# Patient Record
Sex: MALE | Race: WHITE | NOT HISPANIC OR LATINO
[De-identification: names, ages, dates, MRNs, and addresses within clinical notes are randomized per-mention and may not be internally consistent; named-entity substitution may affect disease eponyms.]

---

## 2017-02-16 ENCOUNTER — MEDICATION RENEWAL (OUTPATIENT)
Age: 67
End: 2017-02-16

## 2017-02-21 ENCOUNTER — RX RENEWAL (OUTPATIENT)
Age: 67
End: 2017-02-21

## 2017-02-22 ENCOUNTER — RX RENEWAL (OUTPATIENT)
Age: 67
End: 2017-02-22

## 2017-05-15 ENCOUNTER — RX RENEWAL (OUTPATIENT)
Age: 67
End: 2017-05-15

## 2017-06-14 ENCOUNTER — RX RENEWAL (OUTPATIENT)
Age: 67
End: 2017-06-14

## 2017-06-20 ENCOUNTER — RX RENEWAL (OUTPATIENT)
Age: 67
End: 2017-06-20

## 2017-06-21 ENCOUNTER — APPOINTMENT (OUTPATIENT)
Dept: ENDOCRINOLOGY | Facility: CLINIC | Age: 67
End: 2017-06-21

## 2017-06-22 ENCOUNTER — MEDICATION RENEWAL (OUTPATIENT)
Age: 67
End: 2017-06-22

## 2017-06-22 ENCOUNTER — APPOINTMENT (OUTPATIENT)
Dept: ENDOCRINOLOGY | Facility: CLINIC | Age: 67
End: 2017-06-22

## 2017-06-22 RX ORDER — BLOOD SUGAR DIAGNOSTIC
STRIP MISCELLANEOUS 4 TIMES DAILY
Qty: 2 | Refills: 0 | Status: ACTIVE | COMMUNITY
Start: 2017-06-22

## 2017-06-22 RX ORDER — LANCETS
EACH MISCELLANEOUS
Qty: 2 | Refills: 0 | Status: ACTIVE | COMMUNITY
Start: 2017-06-22

## 2017-06-22 RX ORDER — BLOOD-GLUCOSE METER
W/DEVICE EACH MISCELLANEOUS
Qty: 1 | Refills: 0 | Status: ACTIVE | COMMUNITY
Start: 2017-06-22

## 2017-09-12 ENCOUNTER — RX RENEWAL (OUTPATIENT)
Age: 67
End: 2017-09-12

## 2017-09-14 ENCOUNTER — RX RENEWAL (OUTPATIENT)
Age: 67
End: 2017-09-14

## 2017-09-19 ENCOUNTER — RX RENEWAL (OUTPATIENT)
Age: 67
End: 2017-09-19

## 2017-10-18 ENCOUNTER — RX RENEWAL (OUTPATIENT)
Age: 67
End: 2017-10-18

## 2017-11-30 ENCOUNTER — RX RENEWAL (OUTPATIENT)
Age: 67
End: 2017-11-30

## 2017-12-04 ENCOUNTER — CLINICAL ADVICE (OUTPATIENT)
Age: 67
End: 2017-12-04

## 2017-12-06 ENCOUNTER — APPOINTMENT (OUTPATIENT)
Dept: ENDOCRINOLOGY | Facility: CLINIC | Age: 67
End: 2017-12-06
Payer: MEDICARE

## 2017-12-06 VITALS
WEIGHT: 208 LBS | HEART RATE: 79 BPM | DIASTOLIC BLOOD PRESSURE: 82 MMHG | HEIGHT: 70 IN | SYSTOLIC BLOOD PRESSURE: 122 MMHG | BODY MASS INDEX: 29.78 KG/M2 | OXYGEN SATURATION: 98 %

## 2017-12-06 LAB
25(OH)D3 SERPL-MCNC: 28.6 NG/ML
ALBUMIN SERPL ELPH-MCNC: 4.2 G/DL
ALP BLD-CCNC: 83 U/L
ALT SERPL-CCNC: 25 U/L
ANION GAP SERPL CALC-SCNC: 13 MMOL/L
AST SERPL-CCNC: 17 U/L
BASOPHILS # BLD AUTO: 0.03 K/UL
BASOPHILS NFR BLD AUTO: 0.4 %
BILIRUB SERPL-MCNC: 1.2 MG/DL
BUN SERPL-MCNC: 19 MG/DL
CALCIUM SERPL-MCNC: 9.9 MG/DL
CHLORIDE SERPL-SCNC: 105 MMOL/L
CHOLEST SERPL-MCNC: 217 MG/DL
CHOLEST/HDLC SERPL: 4.4 RATIO
CO2 SERPL-SCNC: 25 MMOL/L
CREAT SERPL-MCNC: 1.18 MG/DL
CREAT SPEC-SCNC: 78 MG/DL
EOSINOPHIL # BLD AUTO: 0.13 K/UL
EOSINOPHIL NFR BLD AUTO: 1.9 %
GLUCOSE SERPL-MCNC: 117 MG/DL
HBA1C MFR BLD HPLC: 8.9
HCT VFR BLD CALC: 49.3 %
HDLC SERPL-MCNC: 49 MG/DL
HGB BLD-MCNC: 15.9 G/DL
IMM GRANULOCYTES NFR BLD AUTO: 0.4 %
LDLC SERPL CALC-MCNC: 146 MG/DL
LYMPHOCYTES # BLD AUTO: 1.6 K/UL
LYMPHOCYTES NFR BLD AUTO: 22.8 %
MAN DIFF?: NORMAL
MCHC RBC-ENTMCNC: 28.6 PG
MCHC RBC-ENTMCNC: 32.3 GM/DL
MCV RBC AUTO: 88.7 FL
MICROALBUMIN 24H UR DL<=1MG/L-MCNC: 0.4 MG/DL
MICROALBUMIN/CREAT 24H UR-RTO: 5 MG/G
MONOCYTES # BLD AUTO: 0.59 K/UL
MONOCYTES NFR BLD AUTO: 8.4 %
NEUTROPHILS # BLD AUTO: 4.63 K/UL
NEUTROPHILS NFR BLD AUTO: 66.1 %
PLATELET # BLD AUTO: 198 K/UL
POTASSIUM SERPL-SCNC: 5.2 MMOL/L
PROT SERPL-MCNC: 7.1 G/DL
PSA SERPL-MCNC: 2.3 NG/ML
RBC # BLD: 5.56 M/UL
RBC # FLD: 13.9 %
SODIUM SERPL-SCNC: 143 MMOL/L
T4 FREE SERPL-MCNC: 1.2 NG/DL
TRIGL SERPL-MCNC: 108 MG/DL
TSH SERPL-ACNC: 1.66 UIU/ML
VIT B12 SERPL-MCNC: 1260 PG/ML
WBC # FLD AUTO: 7.01 K/UL

## 2017-12-06 PROCEDURE — 83036 HEMOGLOBIN GLYCOSYLATED A1C: CPT | Mod: QW

## 2017-12-06 PROCEDURE — 99214 OFFICE O/P EST MOD 30 MIN: CPT | Mod: 25

## 2017-12-09 ENCOUNTER — MOBILE ON CALL (OUTPATIENT)
Age: 67
End: 2017-12-09

## 2017-12-11 ENCOUNTER — MEDICATION RENEWAL (OUTPATIENT)
Age: 67
End: 2017-12-11

## 2018-01-08 ENCOUNTER — APPOINTMENT (OUTPATIENT)
Dept: ENDOCRINOLOGY | Facility: CLINIC | Age: 68
End: 2018-01-08
Payer: MEDICARE

## 2018-01-08 PROCEDURE — 95250 CONT GLUC MNTR PHYS/QHP EQP: CPT

## 2018-01-08 PROCEDURE — 95251 CONT GLUC MNTR ANALYSIS I&R: CPT

## 2018-01-08 PROCEDURE — G0108 DIAB MANAGE TRN  PER INDIV: CPT

## 2018-02-05 ENCOUNTER — MEDICATION RENEWAL (OUTPATIENT)
Age: 68
End: 2018-02-05

## 2018-02-05 ENCOUNTER — CLINICAL ADVICE (OUTPATIENT)
Age: 68
End: 2018-02-05

## 2018-02-05 RX ORDER — INSULIN LISPRO 100 [IU]/ML
100 INJECTION, SOLUTION INTRAVENOUS; SUBCUTANEOUS
Qty: 1 | Refills: 3 | Status: DISCONTINUED | COMMUNITY
Start: 2018-01-08 | End: 2018-02-05

## 2018-02-05 RX ORDER — INSULIN GLULISINE 100 [IU]/ML
100 INJECTION, SOLUTION SUBCUTANEOUS
Qty: 2 | Refills: 3 | Status: ACTIVE | COMMUNITY
Start: 2018-02-05 | End: 1900-01-01

## 2018-02-25 ENCOUNTER — RX RENEWAL (OUTPATIENT)
Age: 68
End: 2018-02-25

## 2018-04-24 ENCOUNTER — APPOINTMENT (OUTPATIENT)
Dept: ENDOCRINOLOGY | Facility: CLINIC | Age: 68
End: 2018-04-24

## 2018-06-26 ENCOUNTER — RX RENEWAL (OUTPATIENT)
Age: 68
End: 2018-06-26

## 2018-06-27 ENCOUNTER — RX RENEWAL (OUTPATIENT)
Age: 68
End: 2018-06-27

## 2018-07-23 ENCOUNTER — RX RENEWAL (OUTPATIENT)
Age: 68
End: 2018-07-23

## 2018-07-27 ENCOUNTER — RX RENEWAL (OUTPATIENT)
Age: 68
End: 2018-07-27

## 2018-09-20 ENCOUNTER — APPOINTMENT (OUTPATIENT)
Dept: FAMILY MEDICINE | Facility: CLINIC | Age: 68
End: 2018-09-20
Payer: MEDICARE

## 2018-09-20 DIAGNOSIS — Z87.09 PERSONAL HISTORY OF OTHER DISEASES OF THE RESPIRATORY SYSTEM: ICD-10-CM

## 2018-09-20 PROCEDURE — 99213 OFFICE O/P EST LOW 20 MIN: CPT

## 2018-09-20 RX ORDER — TICAGRELOR 90 MG/1
90 TABLET ORAL
Qty: 180 | Refills: 0 | Status: ACTIVE | COMMUNITY
Start: 2018-06-02

## 2018-09-20 RX ORDER — PAROXETINE HYDROCHLORIDE 10 MG/1
10 TABLET, FILM COATED ORAL
Qty: 90 | Refills: 0 | Status: ACTIVE | COMMUNITY
Start: 2018-04-27

## 2018-09-20 RX ORDER — ATORVASTATIN CALCIUM 80 MG/1
80 TABLET, FILM COATED ORAL
Qty: 90 | Refills: 0 | Status: ACTIVE | COMMUNITY
Start: 2018-05-06

## 2018-11-05 ENCOUNTER — RX RENEWAL (OUTPATIENT)
Age: 68
End: 2018-11-05

## 2018-11-16 ENCOUNTER — RX RENEWAL (OUTPATIENT)
Age: 68
End: 2018-11-16

## 2018-12-17 ENCOUNTER — RX RENEWAL (OUTPATIENT)
Age: 68
End: 2018-12-17

## 2019-01-15 ENCOUNTER — APPOINTMENT (OUTPATIENT)
Dept: GASTROENTEROLOGY | Facility: CLINIC | Age: 69
End: 2019-01-15

## 2019-01-17 ENCOUNTER — APPOINTMENT (OUTPATIENT)
Dept: FAMILY MEDICINE | Facility: CLINIC | Age: 69
End: 2019-01-17
Payer: MEDICARE

## 2019-01-17 VITALS
WEIGHT: 208 LBS | HEIGHT: 70 IN | DIASTOLIC BLOOD PRESSURE: 80 MMHG | SYSTOLIC BLOOD PRESSURE: 120 MMHG | BODY MASS INDEX: 29.78 KG/M2

## 2019-01-17 PROCEDURE — 99214 OFFICE O/P EST MOD 30 MIN: CPT

## 2019-01-17 RX ORDER — AMOXICILLIN AND CLAVULANATE POTASSIUM 875; 125 MG/1; MG/1
875-125 TABLET, COATED ORAL
Qty: 14 | Refills: 0 | Status: DISCONTINUED | COMMUNITY
Start: 2018-09-20 | End: 2019-01-17

## 2019-02-15 ENCOUNTER — RX RENEWAL (OUTPATIENT)
Age: 69
End: 2019-02-15

## 2019-05-21 ENCOUNTER — APPOINTMENT (OUTPATIENT)
Dept: ENDOCRINOLOGY | Facility: CLINIC | Age: 69
End: 2019-05-21
Payer: MEDICARE

## 2019-05-21 VITALS
DIASTOLIC BLOOD PRESSURE: 64 MMHG | OXYGEN SATURATION: 98 % | BODY MASS INDEX: 30.35 KG/M2 | HEART RATE: 64 BPM | HEIGHT: 70 IN | WEIGHT: 212 LBS | SYSTOLIC BLOOD PRESSURE: 130 MMHG

## 2019-05-21 LAB
GLUCOSE BLDC GLUCOMTR-MCNC: 80
HBA1C MFR BLD HPLC: 8.3

## 2019-05-21 PROCEDURE — 83036 HEMOGLOBIN GLYCOSYLATED A1C: CPT | Mod: QW

## 2019-05-21 PROCEDURE — 99214 OFFICE O/P EST MOD 30 MIN: CPT | Mod: 25

## 2019-05-21 PROCEDURE — 82962 GLUCOSE BLOOD TEST: CPT

## 2019-05-23 LAB
CREAT SPEC-SCNC: 103 MG/DL
MICROALBUMIN 24H UR DL<=1MG/L-MCNC: <1.2 MG/DL
MICROALBUMIN/CREAT 24H UR-RTO: NORMAL MG/G

## 2019-05-24 ENCOUNTER — CLINICAL ADVICE (OUTPATIENT)
Age: 69
End: 2019-05-24

## 2019-06-10 ENCOUNTER — RX RENEWAL (OUTPATIENT)
Age: 69
End: 2019-06-10

## 2019-07-24 ENCOUNTER — RX RENEWAL (OUTPATIENT)
Age: 69
End: 2019-07-24

## 2019-08-21 ENCOUNTER — APPOINTMENT (OUTPATIENT)
Dept: ENDOCRINOLOGY | Facility: CLINIC | Age: 69
End: 2019-08-21
Payer: MEDICARE

## 2019-08-21 VITALS — BODY MASS INDEX: 28.35 KG/M2 | HEIGHT: 70 IN | WEIGHT: 198 LBS | OXYGEN SATURATION: 98 % | HEART RATE: 70 BPM

## 2019-08-21 VITALS — DIASTOLIC BLOOD PRESSURE: 70 MMHG | SYSTOLIC BLOOD PRESSURE: 122 MMHG

## 2019-08-21 LAB — HBA1C MFR BLD HPLC: 8

## 2019-08-21 PROCEDURE — 83036 HEMOGLOBIN GLYCOSYLATED A1C: CPT | Mod: QW

## 2019-08-21 PROCEDURE — 99214 OFFICE O/P EST MOD 30 MIN: CPT | Mod: 25

## 2019-08-21 RX ORDER — PEN NEEDLE, DIABETIC 29 G X1/2"
32G X 4 MM NEEDLE, DISPOSABLE MISCELLANEOUS
Qty: 4 | Refills: 4 | Status: ACTIVE | COMMUNITY
Start: 2019-08-21 | End: 1900-01-01

## 2019-08-21 RX ORDER — PREDNISONE 20 MG/1
20 TABLET ORAL
Qty: 12 | Refills: 0 | Status: COMPLETED | COMMUNITY
Start: 2018-09-20 | End: 2019-08-21

## 2019-11-04 ENCOUNTER — APPOINTMENT (OUTPATIENT)
Dept: ENDOCRINOLOGY | Facility: CLINIC | Age: 69
End: 2019-11-04
Payer: MEDICARE

## 2019-11-04 VITALS
SYSTOLIC BLOOD PRESSURE: 140 MMHG | BODY MASS INDEX: 30.13 KG/M2 | WEIGHT: 210 LBS | HEART RATE: 54 BPM | OXYGEN SATURATION: 98 % | DIASTOLIC BLOOD PRESSURE: 82 MMHG

## 2019-11-04 LAB
GLUCOSE BLDC GLUCOMTR-MCNC: 116
HBA1C MFR BLD HPLC: 8.1

## 2019-11-04 PROCEDURE — 83036 HEMOGLOBIN GLYCOSYLATED A1C: CPT | Mod: QW

## 2019-11-04 PROCEDURE — 82962 GLUCOSE BLOOD TEST: CPT

## 2019-11-04 PROCEDURE — 95251 CONT GLUC MNTR ANALYSIS I&R: CPT

## 2019-11-04 PROCEDURE — 99213 OFFICE O/P EST LOW 20 MIN: CPT | Mod: 25

## 2019-11-11 ENCOUNTER — RX RENEWAL (OUTPATIENT)
Age: 69
End: 2019-11-11

## 2019-12-06 ENCOUNTER — MEDICATION RENEWAL (OUTPATIENT)
Age: 69
End: 2019-12-06

## 2020-01-13 ENCOUNTER — CLINICAL ADVICE (OUTPATIENT)
Age: 70
End: 2020-01-13

## 2020-02-05 ENCOUNTER — RESULT CHARGE (OUTPATIENT)
Age: 70
End: 2020-02-05

## 2020-02-05 ENCOUNTER — APPOINTMENT (OUTPATIENT)
Dept: ENDOCRINOLOGY | Facility: CLINIC | Age: 70
End: 2020-02-05
Payer: MEDICARE

## 2020-02-05 VITALS
BODY MASS INDEX: 29.92 KG/M2 | SYSTOLIC BLOOD PRESSURE: 142 MMHG | HEIGHT: 70 IN | OXYGEN SATURATION: 98 % | DIASTOLIC BLOOD PRESSURE: 70 MMHG | WEIGHT: 209 LBS | HEART RATE: 61 BPM

## 2020-02-05 DIAGNOSIS — I25.10 ATHEROSCLEROTIC HEART DISEASE OF NATIVE CORONARY ARTERY W/OUT ANGINA PECTORIS: ICD-10-CM

## 2020-02-05 LAB — HBA1C MFR BLD HPLC: 8

## 2020-02-05 PROCEDURE — 99214 OFFICE O/P EST MOD 30 MIN: CPT

## 2020-02-25 ENCOUNTER — RX RENEWAL (OUTPATIENT)
Age: 70
End: 2020-02-25

## 2020-05-21 ENCOUNTER — APPOINTMENT (OUTPATIENT)
Dept: ENDOCRINOLOGY | Facility: CLINIC | Age: 70
End: 2020-05-21

## 2020-08-07 ENCOUNTER — RX RENEWAL (OUTPATIENT)
Age: 70
End: 2020-08-07

## 2020-08-25 ENCOUNTER — RX RENEWAL (OUTPATIENT)
Age: 70
End: 2020-08-25

## 2020-09-17 ENCOUNTER — EMERGENCY (EMERGENCY)
Facility: HOSPITAL | Age: 70
LOS: 1 days | Discharge: HOME | End: 2020-09-17
Attending: EMERGENCY MEDICINE
Payer: MEDICARE

## 2020-09-17 VITALS
TEMPERATURE: 98 F | OXYGEN SATURATION: 98 % | SYSTOLIC BLOOD PRESSURE: 179 MMHG | HEART RATE: 82 BPM | DIASTOLIC BLOOD PRESSURE: 76 MMHG | RESPIRATION RATE: 18 BRPM | HEIGHT: 69 IN | WEIGHT: 199.96 LBS

## 2020-09-17 LAB
ALBUMIN SERPL ELPH-MCNC: 4.5 G/DL — SIGNIFICANT CHANGE UP (ref 3.3–5)
ALP SERPL-CCNC: 107 U/L — SIGNIFICANT CHANGE UP (ref 40–120)
ALT FLD-CCNC: 34 U/L — SIGNIFICANT CHANGE UP (ref 10–45)
ANION GAP SERPL CALC-SCNC: 11 MMOL/L — SIGNIFICANT CHANGE UP (ref 5–17)
APPEARANCE UR: SIGNIFICANT CHANGE UP
APTT BLD: 26.5 SEC — LOW (ref 27.5–35.5)
AST SERPL-CCNC: 22 U/L — SIGNIFICANT CHANGE UP (ref 10–40)
BACTERIA # UR AUTO: ABNORMAL
BASE EXCESS BLDV CALC-SCNC: 1.9 MMOL/L — SIGNIFICANT CHANGE UP (ref -2–2)
BASOPHILS # BLD AUTO: 0.03 K/UL — SIGNIFICANT CHANGE UP (ref 0–0.2)
BASOPHILS NFR BLD AUTO: 0.5 % — SIGNIFICANT CHANGE UP (ref 0–2)
BILIRUB SERPL-MCNC: 0.7 MG/DL — SIGNIFICANT CHANGE UP (ref 0.2–1.2)
BILIRUB UR-MCNC: NEGATIVE — SIGNIFICANT CHANGE UP
BUN SERPL-MCNC: 32 MG/DL — HIGH (ref 7–23)
CA-I SERPL-SCNC: 1.22 MMOL/L — SIGNIFICANT CHANGE UP (ref 1.12–1.3)
CALCIUM SERPL-MCNC: 10 MG/DL — SIGNIFICANT CHANGE UP (ref 8.4–10.5)
CHLORIDE BLDV-SCNC: 110 MMOL/L — HIGH (ref 96–108)
CHLORIDE SERPL-SCNC: 107 MMOL/L — SIGNIFICANT CHANGE UP (ref 96–108)
CO2 BLDV-SCNC: 28 MMOL/L — SIGNIFICANT CHANGE UP (ref 22–30)
CO2 SERPL-SCNC: 24 MMOL/L — SIGNIFICANT CHANGE UP (ref 22–31)
COLOR SPEC: ABNORMAL
CREAT SERPL-MCNC: 1.52 MG/DL — HIGH (ref 0.5–1.3)
DIFF PNL FLD: ABNORMAL
EOSINOPHIL # BLD AUTO: 0.14 K/UL — SIGNIFICANT CHANGE UP (ref 0–0.5)
EOSINOPHIL NFR BLD AUTO: 2.5 % — SIGNIFICANT CHANGE UP (ref 0–6)
EPI CELLS # UR: 0 /HPF — SIGNIFICANT CHANGE UP
GAS PNL BLDV: 141 MMOL/L — SIGNIFICANT CHANGE UP (ref 135–145)
GAS PNL BLDV: SIGNIFICANT CHANGE UP
GAS PNL BLDV: SIGNIFICANT CHANGE UP
GLUCOSE BLDV-MCNC: 189 MG/DL — HIGH (ref 70–99)
GLUCOSE SERPL-MCNC: 203 MG/DL — HIGH (ref 70–99)
GLUCOSE UR QL: ABNORMAL
HCO3 BLDV-SCNC: 27 MMOL/L — SIGNIFICANT CHANGE UP (ref 21–29)
HCT VFR BLD CALC: 43 % — SIGNIFICANT CHANGE UP (ref 39–50)
HCT VFR BLDA CALC: 44 % — SIGNIFICANT CHANGE UP (ref 39–50)
HGB BLD CALC-MCNC: 14.4 G/DL — SIGNIFICANT CHANGE UP (ref 13–17)
HGB BLD-MCNC: 13.7 G/DL — SIGNIFICANT CHANGE UP (ref 13–17)
HYALINE CASTS # UR AUTO: 0 /LPF — SIGNIFICANT CHANGE UP (ref 0–7)
IMM GRANULOCYTES NFR BLD AUTO: 0.5 % — SIGNIFICANT CHANGE UP (ref 0–1.5)
INR BLD: 1.03 RATIO — SIGNIFICANT CHANGE UP (ref 0.88–1.16)
KETONES UR-MCNC: NEGATIVE — SIGNIFICANT CHANGE UP
LACTATE BLDV-MCNC: 2 MMOL/L — SIGNIFICANT CHANGE UP (ref 0.7–2)
LEUKOCYTE ESTERASE UR-ACNC: ABNORMAL
LYMPHOCYTES # BLD AUTO: 1.12 K/UL — SIGNIFICANT CHANGE UP (ref 1–3.3)
LYMPHOCYTES # BLD AUTO: 20.1 % — SIGNIFICANT CHANGE UP (ref 13–44)
MCHC RBC-ENTMCNC: 28.4 PG — SIGNIFICANT CHANGE UP (ref 27–34)
MCHC RBC-ENTMCNC: 31.9 GM/DL — LOW (ref 32–36)
MCV RBC AUTO: 89.2 FL — SIGNIFICANT CHANGE UP (ref 80–100)
MONOCYTES # BLD AUTO: 0.63 K/UL — SIGNIFICANT CHANGE UP (ref 0–0.9)
MONOCYTES NFR BLD AUTO: 11.3 % — SIGNIFICANT CHANGE UP (ref 2–14)
NEUTROPHILS # BLD AUTO: 3.63 K/UL — SIGNIFICANT CHANGE UP (ref 1.8–7.4)
NEUTROPHILS NFR BLD AUTO: 65.1 % — SIGNIFICANT CHANGE UP (ref 43–77)
NITRITE UR-MCNC: NEGATIVE — SIGNIFICANT CHANGE UP
NRBC # BLD: 0 /100 WBCS — SIGNIFICANT CHANGE UP (ref 0–0)
PCO2 BLDV: 47 MMHG — SIGNIFICANT CHANGE UP (ref 35–50)
PH BLDV: 7.38 — SIGNIFICANT CHANGE UP (ref 7.35–7.45)
PH UR: 6.5 — SIGNIFICANT CHANGE UP (ref 5–8)
PLATELET # BLD AUTO: 206 K/UL — SIGNIFICANT CHANGE UP (ref 150–400)
PO2 BLDV: 31 MMHG — SIGNIFICANT CHANGE UP (ref 25–45)
POTASSIUM BLDV-SCNC: 4.4 MMOL/L — SIGNIFICANT CHANGE UP (ref 3.5–5.3)
POTASSIUM SERPL-MCNC: 4.3 MMOL/L — SIGNIFICANT CHANGE UP (ref 3.5–5.3)
POTASSIUM SERPL-SCNC: 4.3 MMOL/L — SIGNIFICANT CHANGE UP (ref 3.5–5.3)
PROT SERPL-MCNC: 7.1 G/DL — SIGNIFICANT CHANGE UP (ref 6–8.3)
PROT UR-MCNC: ABNORMAL
PROTHROM AB SERPL-ACNC: 12.2 SEC — SIGNIFICANT CHANGE UP (ref 10.6–13.6)
RBC # BLD: 4.82 M/UL — SIGNIFICANT CHANGE UP (ref 4.2–5.8)
RBC # FLD: 14.5 % — SIGNIFICANT CHANGE UP (ref 10.3–14.5)
RBC CASTS # UR COMP ASSIST: >50 /HPF — SIGNIFICANT CHANGE UP (ref 0–4)
SAO2 % BLDV: 54 % — LOW (ref 67–88)
SODIUM SERPL-SCNC: 142 MMOL/L — SIGNIFICANT CHANGE UP (ref 135–145)
SP GR SPEC: 1.02 — SIGNIFICANT CHANGE UP (ref 1.01–1.02)
UROBILINOGEN FLD QL: NEGATIVE — SIGNIFICANT CHANGE UP
WBC # BLD: 5.58 K/UL — SIGNIFICANT CHANGE UP (ref 3.8–10.5)
WBC # FLD AUTO: 5.58 K/UL — SIGNIFICANT CHANGE UP (ref 3.8–10.5)
WBC UR QL: 20 /HPF — HIGH (ref 0–5)

## 2020-09-17 PROCEDURE — 80053 COMPREHEN METABOLIC PANEL: CPT

## 2020-09-17 PROCEDURE — 87186 SC STD MICRODIL/AGAR DIL: CPT

## 2020-09-17 PROCEDURE — 96375 TX/PRO/DX INJ NEW DRUG ADDON: CPT | Mod: XU

## 2020-09-17 PROCEDURE — 51703 INSERT BLADDER CATH COMPLEX: CPT

## 2020-09-17 PROCEDURE — 84132 ASSAY OF SERUM POTASSIUM: CPT

## 2020-09-17 PROCEDURE — 82947 ASSAY GLUCOSE BLOOD QUANT: CPT

## 2020-09-17 PROCEDURE — 81001 URINALYSIS AUTO W/SCOPE: CPT

## 2020-09-17 PROCEDURE — 85025 COMPLETE CBC W/AUTO DIFF WBC: CPT

## 2020-09-17 PROCEDURE — 99284 EMERGENCY DEPT VISIT MOD MDM: CPT | Mod: 25

## 2020-09-17 PROCEDURE — 85018 HEMOGLOBIN: CPT

## 2020-09-17 PROCEDURE — 87086 URINE CULTURE/COLONY COUNT: CPT

## 2020-09-17 PROCEDURE — 85610 PROTHROMBIN TIME: CPT

## 2020-09-17 PROCEDURE — 85730 THROMBOPLASTIN TIME PARTIAL: CPT

## 2020-09-17 PROCEDURE — 82330 ASSAY OF CALCIUM: CPT

## 2020-09-17 PROCEDURE — 82435 ASSAY OF BLOOD CHLORIDE: CPT

## 2020-09-17 PROCEDURE — 74177 CT ABD & PELVIS W/CONTRAST: CPT | Mod: 26

## 2020-09-17 PROCEDURE — 82803 BLOOD GASES ANY COMBINATION: CPT

## 2020-09-17 PROCEDURE — 99285 EMERGENCY DEPT VISIT HI MDM: CPT | Mod: GC

## 2020-09-17 PROCEDURE — 83605 ASSAY OF LACTIC ACID: CPT

## 2020-09-17 PROCEDURE — 85014 HEMATOCRIT: CPT

## 2020-09-17 PROCEDURE — 96374 THER/PROPH/DIAG INJ IV PUSH: CPT | Mod: XU

## 2020-09-17 PROCEDURE — 74177 CT ABD & PELVIS W/CONTRAST: CPT

## 2020-09-17 PROCEDURE — 96376 TX/PRO/DX INJ SAME DRUG ADON: CPT

## 2020-09-17 PROCEDURE — 84295 ASSAY OF SERUM SODIUM: CPT

## 2020-09-17 RX ORDER — MORPHINE SULFATE 50 MG/1
4 CAPSULE, EXTENDED RELEASE ORAL ONCE
Refills: 0 | Status: DISCONTINUED | OUTPATIENT
Start: 2020-09-17 | End: 2020-09-17

## 2020-09-17 RX ORDER — ACETAMINOPHEN 500 MG
975 TABLET ORAL ONCE
Refills: 0 | Status: COMPLETED | OUTPATIENT
Start: 2020-09-17 | End: 2020-09-17

## 2020-09-17 RX ORDER — MORPHINE SULFATE 50 MG/1
2 CAPSULE, EXTENDED RELEASE ORAL ONCE
Refills: 0 | Status: DISCONTINUED | OUTPATIENT
Start: 2020-09-17 | End: 2020-09-17

## 2020-09-17 RX ORDER — CEFTRIAXONE 500 MG/1
1000 INJECTION, POWDER, FOR SOLUTION INTRAMUSCULAR; INTRAVENOUS ONCE
Refills: 0 | Status: COMPLETED | OUTPATIENT
Start: 2020-09-17 | End: 2020-09-17

## 2020-09-17 RX ADMIN — Medication 975 MILLIGRAM(S): at 23:59

## 2020-09-17 RX ADMIN — MORPHINE SULFATE 2 MILLIGRAM(S): 50 CAPSULE, EXTENDED RELEASE ORAL at 23:59

## 2020-09-17 RX ADMIN — CEFTRIAXONE 100 MILLIGRAM(S): 500 INJECTION, POWDER, FOR SOLUTION INTRAMUSCULAR; INTRAVENOUS at 23:12

## 2020-09-17 RX ADMIN — MORPHINE SULFATE 4 MILLIGRAM(S): 50 CAPSULE, EXTENDED RELEASE ORAL at 22:03

## 2020-09-17 NOTE — ED PROVIDER NOTE - CLINICAL SUMMARY MEDICAL DECISION MAKING FREE TEXT BOX
69 M on Brillinta presenting with hematuria PE: VSS, urine dark red, +suprapubic and R CVA ttp DDx: Hemorrhagic cystitis likely, will assess for other etiology w CT. Plan: Labs, urine, CT, likely CBI.

## 2020-09-17 NOTE — ED ADULT NURSE NOTE - PSH
arthroscopy  left knee 1980, 1981  cholecystectomy  2011  laminectomy  L3/L4/L5 1979, 1980, 2010  LASIK  2000  Trigger finger release  right hand digist 2, 3

## 2020-09-17 NOTE — CONSULT NOTE ADULT - ASSESSMENT
A/P: 69M CAD s/p stents, DM, HTN, HLD who presented to ED with c/o 2 days of gross hematuria with development of clots. Pt afebrile with stable vital signs. CT with 2.7 cm x 2,3 cm fluid lesion in prostate, but no signs of bladder/kidney malignancy, stones or other acute cause of bleeding. Pt irrigated at the bedside with aspiration of minimal clot and improvement of color.     Recs:  - Continue ramirez catheter for drainage  - Encourage increase in PO hydration  - Would begin Cipro 500mg BID empirically  - Follow up Urine Culture  - Outpatient hematuria workup with Urologist Dr. Messina at [facility] [phone]  - If color remains clear red without clots, pt may be DCed with close outpatient follow up  - Will reassess color prior to DC    Discussed with attending on call Dr. Eaton     A/P: 69M CAD s/p stents, DM, HTN, HLD who presented to ED with c/o 2 days of gross hematuria with development of clots. Pt afebrile with stable vital signs. CT with 2.7 cm x 2,3 cm fluid lesion in prostate, but no signs of bladder/kidney malignancy, stones or other acute cause of bleeding. Pt irrigated at the bedside with aspiration of minimal clot and improvement of color.     Recs:  - Continue ramirez catheter for drainage. Outpt TOV  - Encourage increase in PO hydration  - Would begin Cipro 500mg BID empirically  - Follow up Urine Culture  - Outpatient hematuria workup with Urologist Dr. Messina at [facility] [phone]  - If color remains clear red without clots, pt may be DCed with close outpatient follow up  - Will reassess color prior to DC    Discussed with attending on call Dr. Eaton

## 2020-09-17 NOTE — ED PROVIDER NOTE - NSFOLLOWUPINSTRUCTIONS_ED_ALL_ED_FT
You were seen for blood in your urine.    Your blood work and CT scan are generally unremarkable -- you have a small lesion in your prostate.  Please discuss this finding with your urologist.    Follow up with your urologist in 1-2 days and bring a copy of your results.    If you have any concerning symptoms, seek immediate medical attention.

## 2020-09-17 NOTE — ED PROVIDER NOTE - CARE PROVIDER_API CALL
Jordy Messina)  Urology  [address], [address]  [place] [address]  Phone: [phone]  Fax: [phone]  Follow Up Time:

## 2020-09-17 NOTE — ED PROVIDER NOTE - OBJECTIVE STATEMENT
69M CAD s/p stents, DM presenting with 2 days of gross hematuria. Started off as blood tinged with dysuria and urinary urgency. Associated symptoms of flank pain (R sided). No nausea, vomiting, fevers, chills. No smoking history, no hx of kidney stones, never has hematuria before. On Brillinta (prescribed by cardiologist).

## 2020-09-17 NOTE — ED ADULT NURSE NOTE - OBJECTIVE STATEMENT
Pt is a 68 y/o M, PMH HLD, HTN, DM, s/p stent placement on Brilinta, presenting to ED c/o hematuria. Pt states that he has been noticing blood and blood clots in his urine x 2 days. Pt states that he has also been experiencing urgency and burning with urination as well as R sided lower back pain. Pt denies any blood in stool/dark stool. Pt denies headache, dizziness, chest pain, palpitations, cough, SOB, abdominal pain, n/v/d, fevers, chills at this time. Pt is A&Ox4, ambulates independently and steadily, moves all extremities, resting in bed comfortably with call bell at bedside, safety maintained.

## 2020-09-17 NOTE — ED ADULT NURSE REASSESSMENT NOTE - NS ED NURSE REASSESS COMMENT FT1
Pt bladder scanned post void, 235 mLs of urine on scan. MD Mckeon aware, safety precautions in place, call bell within reach.

## 2020-09-17 NOTE — ED PROVIDER NOTE - ATTENDING CONTRIBUTION TO CARE
70 yo male p/w gross hematuria, on brilinta.  no prior urologic issues.  nontoxic on exam.  not renal colic.  hemorrhagic cystitis less likely.  will check labs, UA, uro consult, CT urogram and reassess.

## 2020-09-17 NOTE — CHART NOTE - NSCHARTNOTEFT_GEN_A_CORE
Color reassessed. Penaloza color draining see through rita colored urine without clots.   OK to DC from Urologic Standpoint.  Please give pt a leg bag to go home with and a larger drainage bag for nighttime.   Follow up/ABX plan in official consult note.  Pt complaining of intense L knee pain and spasms, which he gets occasionally now he is s/p knee replacement surgery.  Notified ED providers. Plan for knee pain per ED.

## 2020-09-17 NOTE — ED ADULT NURSE NOTE - CAS EDN DISCHARGE INTERVENTIONS
IV discontinued, cath removed intact leg bag given to patient, pt educated on use of leg bag/IV discontinued, cath removed intact

## 2020-09-17 NOTE — ED ADULT NURSE REASSESSMENT NOTE - NS ED NURSE REASSESS COMMENT FT1
Pt updated on plan of care, awaiting urology. Pt given water and a sandwich, positioned for comfort, wheels locked, bed in lowest position, NSR on tele, call bell within reach.

## 2020-09-17 NOTE — ED PROVIDER NOTE - PROGRESS NOTE DETAILS
Spoke to Dr. Messina's on call doctor - recommended CT urogram. Called urology PA about CBI. Will see pt in ED. TOBIAS Roberson PGY2 Francis PGY2 - Pt. signed out to me by Dr. Mejias.  Pt. p/w hematuria w/ passage of clots.  Pending CT, urology consult for possible CBI. Francis PGY2 - No blood clots on CBI, urine is no longer red.  Per urology, pt. can be discharged w/ cipro 500mg BID for 14 days and urology f/u.  Conveyed this to pt., who understands and agrees w/ plan.  All other questions and concerns have been addressed.  Pt. will be discharged. Patient signed out to me by the prior attending.  The patient's disposition was discussed with the treating team and agreed upon.  Miguel Kirk M.D. (attending) patient reporting with chief complaint of bloody urine. Seen by urology, hematuria cleared status post cbi, patient placed on leg bag and educated that the Penaloza should stay in place to ensure he does not become obstructed, discharge information given for Dr. Siddharth Spence. The patient was serially evaluated throughout emergency department course. There was no acute deterioration up to this time in the department. Patient has demonstrated clinical improvement and is stable, feels better at this time according to emergency department team. Agree with goals/plan of emergency department care as described in this physician's electronic medical record, including diagnostics, therapeutics and consultation as clinically warranted. Will discharge home with close outpatient follow up with primary care physician/provider and specialist if necessary. The patient and/or family was educated on concerning signs and features to return to the emergency department, in layman terms, including but not limited to: nausea, vomiting, fever, chills, persistent/worsening symptoms or any concerns at all. No immediate life threatening issues present on history, clinical exam, or any diagnostic evaluation. The patient is a safe disposition home, has capacity and insight into their condition, is ambulatory in the Emergency Department with no further questions and will follow up with their doctor(s) this week. The patient and/or family were given the opportunity to ask questions and have them answered in full. The patient and/or family are with capacity and insight into the situation, treatment, risks, benefits, alternative therapies, and understand that they can ask any further questions if needed. Patient and/or family/guardian understands anticipatory guidance and was given strict return and follow up precautions. The patient and/or family/guardian has been informed, in layman terms, of all concerning signs and symptoms to return to Emergency Department, the necessity to follow up with the PMD/Clinic/follow up provided within 2-3 days was explained, and the patient and/or family/guardian reports understanding of above with capacity and insight. The patient and/or family/guardian were informed of any results of their tests and are were encouraged to follow up on the findings with their doctor as well as the need to inform their doctor of any results. The patient and/or family/guardian are aware of the need to follow up with repeat testing as applicable and report understanding of the above with capacity and insight. The patient and/or family/guardian was made aware of any pending test results at the time of discharge and of the need to call back for the final results a well as the need to inform their doctor of the results.

## 2020-09-17 NOTE — ED PROVIDER NOTE - NS ED ROS FT
CONST: no fevers, no chills, no trauma  EYES: no pain, no blurry vision   ENT: no sore throat, no epistaxis, no rhinorrhea  CV: no chest pain, no palpitations, no orthopnea, no extremity pain or swelling  RESP: no shortness of breath, no cough, no sputum, no pleurisy, no wheezing  ABD: no abdominal pain, no nausea, no vomiting, no diarrhea, no black or bloody stool  : + dysuria, + hematuria, no frequency, + urgency  MSK: +R flank pain, no neck pain, no extremity pain  NEURO: no headache, no sensory disturbances, no focal weakness, no dizziness  HEME: no easy bleeding or bruising  SKIN: no diaphoresis, no rash

## 2020-09-17 NOTE — CONSULT NOTE ADULT - SUBJECTIVE AND OBJECTIVE BOX
HPI: 69M CAD s/p stents, DM, HTN, HLD who presented to ED with c/o 2 days of gross hematuria.  Pt states initially it was a clear pink color and associated with increased urgency and dysuria. Pt was initially not concerned, but today, color became very dark and he was began passing clots. Having disruption in urinary stream and passing large clots with feelings of urinary retention. Pt called cardiologist, who spoke with Urologist Dr. Messina who advised the patient to come in for further evaluation. Has had hematuria in the past, but it has always self resolved and was never as dark as it was this time. Denies fever/chills, N/V, flank pain, testicular pain, perineal pain or other acute complaint. No smoking history, no hx of kidney stones. On Brillinta (prescribed by cardiologist). Worked as an automobile .      PAST MEDICAL & SURGICAL HISTORY:  Adrenal gland cyst, right;  Hypertension  Hyperlipidemia  Diabetes mellitus  Trigger finger release  right hand digist 2, 3  LASIK in   arthroscopy left knee ,   laminectomy L3/L4/L5 , ,   cholecystectomy     MEDICATIONS  (STANDING):    MEDICATIONS  (PRN):    FAMILY HISTORY:  Significant cardiac history. Brother and Father  of MI. No  FHx.    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    REVIEW OF SYSTEMS: Otherwise negative as stated in HPI    Physical Exam  Vital signs  T(C): 36.7 (20 @ 19:10), Max: 37 (20 @ 18:09)  HR: 78 (20 @ 22:00)  BP: 124/98 (20 @ 22:00)  SpO2: 97% (20 @ 22:00)    Output    Gen:  NAD    Pulm:  No respiratory distress  	  CV:  RRR    GI:  S/ND/NT    :      MSK:      LABS:       @ 18:54    WBC 5.58  / Hct 43.0  / SCr 1.52         142  |  107  |  32<H>  ----------------------------<  203<H>  4.3   |  24  |  1.52<H>    Ca    10.0      17 Sep 2020 18:54    TPro  7.1  /  Alb  4.5  /  TBili  0.7  /  DBili  x   /  AST  22  /  ALT  34  /  AlkPhos  107  09-17    PT/INR - ( 17 Sep 2020 18:54 )   PT: 12.2 sec;   INR: 1.03 ratio         PTT - ( 17 Sep 2020 18:54 )  PTT:26.5 sec  Urinalysis Basic - ( 17 Sep 2020 18:54 )    Color: Red / Appearance: SEE NOTE / S.025 / pH: x  Gluc: x / Ketone: Negative  / Bili: Negative / Urobili: Negative   Blood: x / Protein: 300 mg/dL / Nitrite: Negative   Leuk Esterase: Moderate / RBC: >50 /hpf / WBC 20 /HPF   Sq Epi: x / Non Sq Epi: 0 /hpf / Bacteria: Moderate        Urine Cx:  Blood Cx:    RADIOLOGY:     HPI: 69M CAD s/p stents, DM, HTN, HLD who presented to ED with c/o 2 days of gross hematuria.  Pt states initially it was a clear pink color and associated with increased urgency and dysuria. Pt was initially not concerned, but today, color became very dark and he was began passing clots. Having disruption in urinary stream and passing large clots with feelings of urinary retention. Pt called cardiologist, who spoke with Urologist Dr. Messina who advised the patient to come in for further evaluation. Has had hematuria in the past, but it has always self resolved and was never as dark as it was this time. Denies fever/chills, N/V, flank pain, testicular pain, perineal pain or other acute complaint. No smoking history, no hx of kidney stones. On Brillinta (prescribed by cardiologist). Worked as an automobile .      PAST MEDICAL & SURGICAL HISTORY:  Adrenal gland cyst, right;  Hypertension  Hyperlipidemia  Diabetes mellitus  Trigger finger release  right hand digist 2, 3  LASIK in   arthroscopy left knee ,   laminectomy L3/L4/L5 , ,   cholecystectomy     MEDICATIONS  (STANDING):    MEDICATIONS  (PRN):    FAMILY HISTORY:  Significant cardiac history. Brother and Father  of MI. No  FHx.    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    REVIEW OF SYSTEMS: Otherwise negative as stated in HPI    Physical Exam  Vital signs  T(C): 36.7 (20 @ 19:10), Max: 37 (20 @ 18:09)  HR: 78 (20 @ 22:00)  BP: 124/98 (20 @ 22:00)  SpO2: 97% (20 @ 22:00)    Output    Gen: No Acute Distress  Pulm: No respiratory distress  Abd: Soft, mild distension, nontender  : No suprapubic tenderness. Circumcised phallus. Dark merlot colored urine at the bedside    At the bedside: Using aseptic technique, 22F 6eye placed with drainage of bladder. Urine within the bladder cloudy yellow, but as 6eye pulled closer to prostate, urine color became dark fruit punch. minimal clot aspirated from the bladder. Color cleared to clear peach. 6eye DCed, new ramirez kit obtained and under sterile conditions 22F silicon 3way placed. No complications and pt tolerated procedure well.     LABS:                        13.7   5.58  )-----------( 206      ( 17 Sep 2020 18:54 )             43.0        -    142  |  107  |  32<H>  ----------------------------<  203<H>  4.3   |  24  |  1.52<H>    Ca    10.0      17 Sep 2020 18:54    TPro  7.1  /  Alb  4.5  /  TBili  0.7  /  DBili  x   /  AST  22  /  ALT  34  /  AlkPhos  107  -    PT/INR - ( 17 Sep 2020 18:54 )   PT: 12.2 sec;   INR: 1.03 ratio    PTT - ( 17 Sep 2020 18:54 )  PTT:26.5 sec    Urinalysis Basic - ( 17 Sep 2020 18:54 )  Color: Red / Appearance: SEE NOTE / S.025 / pH: x  Gluc: x / Ketone: Negative  / Bili: Negative / Urobili: Negative   Blood: x / Protein: 300 mg/dL / Nitrite: Negative   Leuk Esterase: Moderate / RBC: >50 /hpf / WBC 20 /HPF   Sq Epi: x / Non Sq Epi: 0 /hpf / Bacteria: Moderate    Urine Cx: pending    RADIOLOGY:  < from: CT Abdomen and Pelvis w/ IV Cont (20 @ 20:11) >  IMPRESSION:  No hydronephrosis. No abnormalities noted within the ureters.    2.7 x 2.3 cm fluid density lesion within the prostate.    Right adrenal myelolipoma unchanged from 2012    < end of copied text >

## 2020-09-17 NOTE — ED ADULT NURSE NOTE - PMH
Adrenal gland cyst  right ;  Diabetes mellitus  x 10 years   Hyperlipidemia  pt states last rx 1-2 years ago  Hypertension

## 2020-09-17 NOTE — ED PROVIDER NOTE - PHYSICAL EXAMINATION
Const: Well-nourished, Well-developed, appearing stated age.  Eyes: no conjunctival injection, and symmetrical lids.  HEENT: Head NCAT, no lesions. Atraumatic external nose and ears. Moist MM.  Neck: Symmetric, trachea midline.   CVS: +S1/S2, Peripheral pulses 2+ and equal in all extremities.  RESP: Unlabored respiratory effort. Clear to auscultation bilaterally.  GI: +Suprapubic, + R CVA tenderness. rest of abd is soft nontender. Nondistended,   MSK: Normocephalic/Atraumatic, Lower Extremities w/o calf tenderness or edema b/l.   Skin: Warm, dry and intact.   Neuro: CNs II-XII grossly intact. Motor & Sensation grossly intact.  Psych: Awake, Alert, & Oriented (AAO) x3. Appropriate mood and affect.

## 2020-09-17 NOTE — ED PROVIDER NOTE - PATIENT PORTAL LINK FT
You can access the FollowMyHealth Patient Portal offered by [facility] by registering at the following website: [email or web address]. By joining [facility]’s FollowMyHealth portal, you will also be able to view your health information using other applications (apps) compatible with our system.

## 2020-09-18 VITALS
RESPIRATION RATE: 18 BRPM | OXYGEN SATURATION: 100 % | HEART RATE: 80 BPM | SYSTOLIC BLOOD PRESSURE: 148 MMHG | TEMPERATURE: 97 F | DIASTOLIC BLOOD PRESSURE: 76 MMHG

## 2020-09-18 RX ORDER — CIPROFLOXACIN LACTATE 400MG/40ML
1 VIAL (ML) INTRAVENOUS
Qty: 28 | Refills: 0
Start: 2020-09-18 | End: 2020-10-01

## 2020-09-18 NOTE — PROCEDURE NOTE - NSSITEPREP_SKIN_A_CORE
Adherence to aseptic technique: hand hygiene prior to donning barriers (gown, gloves), don cap and mask, sterile drape over patient chlorhexidine

## 2020-09-18 NOTE — PROCEDURE NOTE - ADDITIONAL PROCEDURE DETAILS
At the bedside: Using aseptic technique, 22F 6eye placed with drainage of bladder. Urine within the bladder cloudy yellow, but as 6eye pulled closer to prostate, urine color became dark fruit punch. minimal clot aspirated from the bladder. Color cleared to clear peach. 6eye DCed, new ramirez kit obtained and under sterile conditions 22F silicon 3way placed. No complications and pt tolerated procedure well.

## 2020-09-18 NOTE — PROCEDURE NOTE - NSURITECHNIQUE_GU_A_CORE
The collection bag is below the level of the patient and urinary bladder/All applicable medical record documentation is completed/The site was cleaned with soap/water and sterile solution (betadine)/The catheter was appropriately lubricated/Proper hand hygiene was performed/Sterile gloves were worn for the duration of the procedure/A sterile drape was used to cover all adjacent areas/The catheter was secured with a securement device (e.g. StatLock)/The urinary drainage system is closed at the end of the procedure

## 2020-09-19 NOTE — ED POST DISCHARGE NOTE - RESULT SUMMARY
UCx PRELIM >100 K E. coli, pt DC'ed on Cipro, pending sensitivities to determine if need for call back vs appropriate care received. -Ilya Monae PA-C

## 2020-09-22 ENCOUNTER — APPOINTMENT (OUTPATIENT)
Dept: ENDOCRINOLOGY | Facility: CLINIC | Age: 70
End: 2020-09-22
Payer: MEDICARE

## 2020-09-22 VITALS
WEIGHT: 211 LBS | HEIGHT: 70 IN | SYSTOLIC BLOOD PRESSURE: 140 MMHG | OXYGEN SATURATION: 98 % | BODY MASS INDEX: 30.21 KG/M2 | DIASTOLIC BLOOD PRESSURE: 80 MMHG | HEART RATE: 50 BPM | TEMPERATURE: 98.1 F

## 2020-09-22 DIAGNOSIS — I10 ESSENTIAL (PRIMARY) HYPERTENSION: ICD-10-CM

## 2020-09-22 DIAGNOSIS — E11.65 TYPE 2 DIABETES MELLITUS WITH HYPERGLYCEMIA: ICD-10-CM

## 2020-09-22 DIAGNOSIS — E78.5 HYPERLIPIDEMIA, UNSPECIFIED: ICD-10-CM

## 2020-09-22 DIAGNOSIS — Z20.828 CONTACT WITH AND (SUSPECTED) EXPOSURE TO OTHER VIRAL COMMUNICABLE DISEASES: ICD-10-CM

## 2020-09-22 DIAGNOSIS — E55.9 VITAMIN D DEFICIENCY, UNSPECIFIED: ICD-10-CM

## 2020-09-22 PROCEDURE — 99214 OFFICE O/P EST MOD 30 MIN: CPT | Mod: CS

## 2020-09-22 RX ORDER — EMPAGLIFLOZIN 10 MG/1
10 TABLET, FILM COATED ORAL
Qty: 90 | Refills: 1 | Status: DISCONTINUED | COMMUNITY
Start: 2017-12-11 | End: 2020-09-22

## 2020-09-22 NOTE — PHYSICAL EXAM
[Alert] : alert [Well Nourished] : well nourished [Obese] : obese [Normal Sclera/Conjunctiva] : normal sclera/conjunctiva [Normal Outer Ear/Nose] : the ears and nose were normal in appearance [No Respiratory Distress] : no respiratory distress [No Accessory Muscle Use] : no accessory muscle use [Normal Rate and Effort] : normal respiratory rate and effort [Clear to Auscultation] : lungs were clear to auscultation bilaterally [Normal S1, S2] : normal S1 and S2 [No Murmurs] : no murmurs [Normal Rate] : heart rate was normal [Regular Rhythm] : with a regular rhythm [No Edema] : no peripheral edema [Normal Bowel Sounds] : normal bowel sounds [Not Tender] : non-tender [Not Distended] : not distended [Soft] : abdomen soft [Normal Gait] : normal gait [No Tremors] : no tremors [Normal Affect] : the affect was normal [Normal Insight/Judgement] : insight and judgment were intact [Normal Mood] : the mood was normal

## 2020-09-23 PROBLEM — I10 BENIGN HYPERTENSION: Status: ACTIVE | Noted: 2019-11-04

## 2020-09-23 NOTE — REVIEW OF SYSTEMS
[Fatigue] : no fatigue [Decreased Appetite] : appetite not decreased [Fever] : no fever [Chest Pain] : no chest pain [Palpitations] : no palpitations [Lower Ext Edema] : no lower extremity edema [Shortness Of Breath] : no shortness of breath [Cough] : no cough [SOB on Exertion] : no shortness of breath on exertion [Nausea] : no nausea [Abdominal Pain] : no abdominal pain [Vomiting] : no vomiting [Diarrhea] : no diarrhea [Headaches] : no headaches [Difficulty Walking] : no difficulty walking [Depression] : no depression [FreeTextEntry8] : Hematuria resolved since 2 days prior to visit

## 2020-09-23 NOTE — HISTORY OF PRESENT ILLNESS
[FreeTextEntry1] : 68 yo M w/ type II DM (diagnosed in 2000), CAD (s/p MI, 90% CAD, s/p 1 stent), HTN, HLD presents for followup. \par        Patient last seen in endocrinology practice in Feb 2020, last A1c 8 in Feb 2020.  During last visit, attempts were made to start SGLT 2 inhibitor, but all SGLT 2 inhibitors turned out to be too expensive with patient's insurance. Patient had h/o pancreatitis, thus GLP-1 were avoided. Previously unable to tolerate metformin or victoza. \par       Since last visit, patient were doing well, reported FS well controlled until 1 month ago. He developed hematuria a month ago, and concurrently noticed FS 300s-400s. Fasting FS 70s-300s, random FS 100s- 400s. He went to ED on 9/15, diagnosed with E coli UTI, discharged on ramirez catheter and antibiotics. He saw urologist last week, s/p Ramirez catheter removal, hematuria resolved. \par        Current medication regimen: Lantus 50 units qday. Repaglinide 2mg -2mg-3mg TIDAC. For the last month, patient has been randomly checking FS based on symptoms (lightheadedness, general feeling unwell) throughout the day, when FS 300s-400s, he injected Humalog 5 units to 8 units depending on FS. \par         Meals: breakfast: oatmel + fresh fruits, once in a while omlet. Lunch: turkey sandwitch vs. salad. Dinner: whole wheat pasta and 1 beef burger or one serving of shrimp \par         Patient has not seen an ophthalmologist or podiatrist since last visit. \par

## 2020-09-23 NOTE — END OF VISIT
[FreeTextEntry3] : Patient is a 69-year-old male, with type 2 diabetes, diagnosed in 2000, complicated by CAD status post MI with cardiac stent, hypertension, hyperlipidemia, currently on long-acting insulin Lantus 50 units at bedtime, repaglinide 2/2/3 milligrams 3 times daily with meals.  Unable to prescribe SGLT2 inhibitor due to cost of the medication.  Not a candidate for GLP-1 receptor agonist secondary to history of pancreatitis.  Recently had a urinary tract infection, with recent increase in glucose.  Patient has been using his Humalog intermittently for hyperglycemia.\par A1c found to be elevated at 9.7%.\par Recommend continue with Lantus 50 units at bedtime\par Recommend to continue with repaglinide 2/2/3 milligrams 3 times daily with meals\par Recommend to start low-dose sliding scale, starting with 2 units, ISF of 1: 50 above 150 mg/dL.  As follows. BEFORE meals instead of correcting for hyperglycemia.  \par Take additional \par 2 Unit(s) if Glucose 151 - 200\par 3 Unit(s) if Glucose 201 - 250\par 4 Unit(s) if Glucose 251 - 300\par 5 Unit(s) if Glucose 301 - 350\par 6 Unit(s) if Glucose 351 - 400\par 7 Unit(s) if Glucose GREATER THAN 400\par NOTIFY Provider for blood glucose LESS THAN 70 milliGRAM(s)/deciLiter or GREATER THAN 400 milliGRAM(s)/deciliter. \par \par

## 2020-09-24 LAB
25(OH)D3 SERPL-MCNC: 28.6 NG/ML
APPEARANCE: CLEAR
BACTERIA: NEGATIVE
BILIRUBIN URINE: NEGATIVE
BLOOD URINE: NEGATIVE
CHOLEST SERPL-MCNC: 150 MG/DL
CHOLEST/HDLC SERPL: 3.7 RATIO
COLOR: NORMAL
ESTIMATED AVERAGE GLUCOSE: 232 MG/DL
GLUCOSE QUALITATIVE U: ABNORMAL
HBA1C MFR BLD HPLC: 9.7 %
HDLC SERPL-MCNC: 40 MG/DL
HYALINE CASTS: 1 /LPF
KETONES URINE: NEGATIVE
LDLC SERPL CALC-MCNC: 79 MG/DL
LEUKOCYTE ESTERASE URINE: NEGATIVE
MICROSCOPIC-UA: NORMAL
NITRITE URINE: NEGATIVE
PH URINE: 6
PROTEIN URINE: NEGATIVE
RED BLOOD CELLS URINE: 1 /HPF
SARS-COV-2 IGG SERPL IA-ACNC: 0.01 INDEX
SARS-COV-2 IGG SERPL QL IA: NEGATIVE
SPECIFIC GRAVITY URINE: 1.02
SQUAMOUS EPITHELIAL CELLS: 1 /HPF
TRIGL SERPL-MCNC: 155 MG/DL
UROBILINOGEN URINE: NORMAL
WHITE BLOOD CELLS URINE: 13 /HPF

## 2020-10-01 ENCOUNTER — RX RENEWAL (OUTPATIENT)
Age: 70
End: 2020-10-01

## 2020-11-05 ENCOUNTER — NON-APPOINTMENT (OUTPATIENT)
Age: 70
End: 2020-11-05

## 2020-12-16 PROBLEM — Z87.09 HISTORY OF ACUTE SINUSITIS: Status: RESOLVED | Noted: 2018-09-20 | Resolved: 2020-12-16

## 2020-12-18 ENCOUNTER — RX RENEWAL (OUTPATIENT)
Age: 70
End: 2020-12-18

## 2020-12-21 ENCOUNTER — RX RENEWAL (OUTPATIENT)
Age: 70
End: 2020-12-21

## 2021-01-11 ENCOUNTER — APPOINTMENT (OUTPATIENT)
Dept: ENDOCRINOLOGY | Facility: CLINIC | Age: 71
End: 2021-01-11

## 2021-07-27 ENCOUNTER — RX RENEWAL (OUTPATIENT)
Age: 71
End: 2021-07-27

## 2021-08-24 ENCOUNTER — TRANSCRIPTION ENCOUNTER (OUTPATIENT)
Age: 71
End: 2021-08-24

## 2021-09-16 ENCOUNTER — APPOINTMENT (OUTPATIENT)
Dept: ENDOCRINOLOGY | Facility: CLINIC | Age: 71
End: 2021-09-16

## 2021-09-22 ENCOUNTER — RX RENEWAL (OUTPATIENT)
Age: 71
End: 2021-09-22

## 2021-11-29 ENCOUNTER — RX RENEWAL (OUTPATIENT)
Age: 71
End: 2021-11-29

## 2021-12-06 ENCOUNTER — NON-APPOINTMENT (OUTPATIENT)
Age: 71
End: 2021-12-06

## 2022-01-27 ENCOUNTER — RX RENEWAL (OUTPATIENT)
Age: 72
End: 2022-01-27

## 2022-01-31 ENCOUNTER — RX RENEWAL (OUTPATIENT)
Age: 72
End: 2022-01-31

## 2022-02-19 ENCOUNTER — TRANSCRIPTION ENCOUNTER (OUTPATIENT)
Age: 72
End: 2022-02-19

## 2022-02-22 ENCOUNTER — RX CHANGE (OUTPATIENT)
Age: 72
End: 2022-02-22

## 2022-02-22 RX ORDER — ATORVASTATIN CALCIUM 40 MG/1
40 TABLET, FILM COATED ORAL
Qty: 90 | Refills: 0 | Status: ACTIVE | COMMUNITY
Start: 2021-12-03 | End: 1900-01-01

## 2022-02-23 ENCOUNTER — RX CHANGE (OUTPATIENT)
Age: 72
End: 2022-02-23

## 2022-02-24 ENCOUNTER — RX CHANGE (OUTPATIENT)
Age: 72
End: 2022-02-24

## 2022-03-30 RX ORDER — REPAGLINIDE 2 MG/1
2 TABLET ORAL
Qty: 90 | Refills: 0 | Status: ACTIVE | COMMUNITY
Start: 2019-11-04 | End: 1900-01-01

## 2022-03-30 RX ORDER — REPAGLINIDE 1 MG/1
1 TABLET ORAL
Qty: 30 | Refills: 0 | Status: ACTIVE | COMMUNITY
Start: 2019-12-06 | End: 1900-01-01

## 2022-04-04 ENCOUNTER — APPOINTMENT (OUTPATIENT)
Dept: ENDOCRINOLOGY | Facility: CLINIC | Age: 72
End: 2022-04-04

## 2022-04-11 ENCOUNTER — TRANSCRIPTION ENCOUNTER (OUTPATIENT)
Age: 72
End: 2022-04-11

## 2022-05-05 ENCOUNTER — RX RENEWAL (OUTPATIENT)
Age: 72
End: 2022-05-05

## 2022-05-31 ENCOUNTER — RX RENEWAL (OUTPATIENT)
Age: 72
End: 2022-05-31

## 2022-06-16 ENCOUNTER — RX RENEWAL (OUTPATIENT)
Age: 72
End: 2022-06-16

## 2022-07-05 ENCOUNTER — RX RENEWAL (OUTPATIENT)
Age: 72
End: 2022-07-05

## 2022-07-18 ENCOUNTER — RX RENEWAL (OUTPATIENT)
Age: 72
End: 2022-07-18

## 2022-08-11 ENCOUNTER — RX RENEWAL (OUTPATIENT)
Age: 72
End: 2022-08-11

## 2022-11-29 NOTE — ED ADULT NURSE REASSESSMENT NOTE - NS ED NURSE REASSESS COMMENT FT1
Report received from MARGOT Paniagua. Upon assessment, pt is AO x 4, speaking in full and complete sentences. Pt denies pain, fever, chills, n/v, headache, weakness, dizziness at this time. Pt bladder scanned pre voiding and 400 mLs of urine on scan. MD Mckeon aware, pt provisioned for comfort, wheels locked, bed in lowest position, NSR on tele, call bell within reach. Hemoglobin and Hematocrit result 6.4 and 20

## 2024-02-12 ENCOUNTER — APPOINTMENT (OUTPATIENT)
Dept: PLASTIC SURGERY | Facility: CLINIC | Age: 74
End: 2024-02-12

## 2024-02-12 VITALS
SYSTOLIC BLOOD PRESSURE: 190 MMHG | HEART RATE: 39 BPM | OXYGEN SATURATION: 98 % | DIASTOLIC BLOOD PRESSURE: 84 MMHG | BODY MASS INDEX: 28.63 KG/M2 | TEMPERATURE: 98.2 F | WEIGHT: 200 LBS | HEIGHT: 70 IN

## 2024-02-12 DIAGNOSIS — M65.4 RADIAL STYLOID TENOSYNOVITIS [DE QUERVAIN]: ICD-10-CM

## 2024-02-12 DIAGNOSIS — G56.02 CARPAL TUNNEL SYNDROME, LEFT UPPER LIMB: ICD-10-CM

## 2024-02-12 DIAGNOSIS — E11.9 TYPE 2 DIABETES MELLITUS W/OUT COMPLICATIONS: ICD-10-CM

## 2024-02-12 DIAGNOSIS — M65.30 TRIGGER FINGER, UNSPECIFIED FINGER: ICD-10-CM

## 2024-02-12 PROCEDURE — 99203 OFFICE O/P NEW LOW 30 MIN: CPT | Mod: 25

## 2024-02-12 PROCEDURE — 20550 NJX 1 TENDON SHEATH/LIGAMENT: CPT | Mod: F9

## 2024-02-14 PROBLEM — G56.02 CARPAL TUNNEL SYNDROME OF LEFT WRIST: Status: ACTIVE | Noted: 2024-02-14

## 2024-02-19 PROBLEM — E11.9 TYPE II DIABETES MELLITUS: Status: ACTIVE | Noted: 2024-02-19

## 2024-02-19 PROBLEM — M65.4 TENOSYNOVITIS, DE QUERVAIN: Status: ACTIVE | Noted: 2024-02-19

## 2024-02-19 NOTE — PHYSICAL EXAM
[NI] : Normal [de-identified] : limited motion right hand status post tendon transfer but thumb in good position for use.  [de-identified] : Left wrist +phalens and tinel's sign Right hand +finkelstein test pain over first dorsal compartment Right pinky +locking and triggering +pain over A1 pulley [de-identified] : decreased sensation in left median nerve distribution

## 2024-02-19 NOTE — HISTORY OF PRESENT ILLNESS
[FreeTextEntry1] : Pt presents here today to discuss left hand carpal tunnel syndrome.  Pt has a hx of right hand CTR and tendon transfer.  Pt also states that his right pinky has been locking and triggering for the past two months and also states that is right wrist has been bothering him.   He states that his left 1-3 fingers have been numb and tingling, are swollen and have less range of motion.

## 2024-02-19 NOTE — REVIEW OF SYSTEMS
[Fever] : no fever [Chills] : no chills [Negative] : Heme/Lymph [FreeTextEntry9] : Left hand +numbness and tingling 1-3 fingers, right pinky +locking  [de-identified] : numbness left median nerve distribution

## 2024-02-19 NOTE — ASSESSMENT
[FreeTextEntry1] : I, Dr.Armen Soares  personally performed the evaluation and management (E/M) services and procedures for this patient. That E/M includes conducting the clinically appropriate initial history &/or exam, assessing all conditions, and establishing the plan of care. Today, my TODD, SIVAKUMAR Vidales, was here to observe my evaluation and management service for this patient & follow plan of care established by me going forward.

## 2024-02-19 NOTE — REASON FOR VISIT
[Consultation] : a consultation visit [FreeTextEntry1] : Patient presents today for a consultation regarding his left-hand carpal tunnel release and trigger release for right pinky finger. Patient states he has pain, numbness and tingling in left hand, his thumb, index and 3rd digit fingers are swollen and has less range of motion. He also states his pinky on right hand has been in a locked position for the past 2 months, he is not able to move it and complains of pain. Patient states he hasn't had treatment for right pink finger or left hand carpal tunnel.

## 2024-02-19 NOTE — PROCEDURE
[FreeTextEntry1] : Right pinky trigger finger, right hand Dequervains tenosynovitis  [FreeTextEntry2] : Kenalog  injection given to right 5th finger trigger finger and right wrist [FreeTextEntry3] : 1% plain lidocaine [FreeTextEntry5] : none [FreeTextEntry6] : The right 5th finger and the right first dorsal compartment were prepped and injected with lidocaine. 6 mg of Kenalog was injected into the finger flexor sheath and the 1st dorsal wrist compartment. The patient tolerated the procedure well

## 2024-03-22 ENCOUNTER — NON-APPOINTMENT (OUTPATIENT)
Age: 74
End: 2024-03-22

## 2024-07-15 ENCOUNTER — APPOINTMENT (OUTPATIENT)
Dept: PLASTIC SURGERY | Facility: CLINIC | Age: 74
End: 2024-07-15

## 2024-07-15 VITALS
HEART RATE: 42 BPM | SYSTOLIC BLOOD PRESSURE: 172 MMHG | BODY MASS INDEX: 28.63 KG/M2 | WEIGHT: 200 LBS | HEIGHT: 70 IN | TEMPERATURE: 96.9 F | DIASTOLIC BLOOD PRESSURE: 63 MMHG | OXYGEN SATURATION: 98 %

## 2024-07-15 DIAGNOSIS — G56.02 CARPAL TUNNEL SYNDROME, LEFT UPPER LIMB: ICD-10-CM

## 2024-07-15 PROCEDURE — 20550 NJX 1 TENDON SHEATH/LIGAMENT: CPT | Mod: LT

## 2024-07-15 PROCEDURE — 99213 OFFICE O/P EST LOW 20 MIN: CPT | Mod: 25

## 2024-07-29 ENCOUNTER — OUTPATIENT (OUTPATIENT)
Dept: OUTPATIENT SERVICES | Facility: HOSPITAL | Age: 74
LOS: 1 days | End: 2024-07-29

## 2024-07-29 VITALS
RESPIRATION RATE: 16 BRPM | HEIGHT: 68 IN | SYSTOLIC BLOOD PRESSURE: 179 MMHG | HEART RATE: 76 BPM | OXYGEN SATURATION: 97 % | DIASTOLIC BLOOD PRESSURE: 74 MMHG | TEMPERATURE: 98 F | WEIGHT: 195.99 LBS

## 2024-07-29 DIAGNOSIS — E11.9 TYPE 2 DIABETES MELLITUS WITHOUT COMPLICATIONS: ICD-10-CM

## 2024-07-29 DIAGNOSIS — Z98.890 OTHER SPECIFIED POSTPROCEDURAL STATES: Chronic | ICD-10-CM

## 2024-07-29 DIAGNOSIS — G56.02 CARPAL TUNNEL SYNDROME, LEFT UPPER LIMB: ICD-10-CM

## 2024-07-29 DIAGNOSIS — Z96.652 PRESENCE OF LEFT ARTIFICIAL KNEE JOINT: Chronic | ICD-10-CM

## 2024-07-29 DIAGNOSIS — I25.10 ATHEROSCLEROTIC HEART DISEASE OF NATIVE CORONARY ARTERY WITHOUT ANGINA PECTORIS: ICD-10-CM

## 2024-07-29 DIAGNOSIS — I10 ESSENTIAL (PRIMARY) HYPERTENSION: ICD-10-CM

## 2024-07-29 DIAGNOSIS — I25.10 ATHEROSCLEROTIC HEART DISEASE OF NATIVE CORONARY ARTERY WITHOUT ANGINA PECTORIS: Chronic | ICD-10-CM

## 2024-07-29 LAB
A1C WITH ESTIMATED AVERAGE GLUCOSE RESULT: 9.3 % — HIGH (ref 4–5.6)
ANION GAP SERPL CALC-SCNC: 11 MMOL/L — SIGNIFICANT CHANGE UP (ref 7–14)
BUN SERPL-MCNC: 18 MG/DL — SIGNIFICANT CHANGE UP (ref 7–23)
CALCIUM SERPL-MCNC: 9.3 MG/DL — SIGNIFICANT CHANGE UP (ref 8.4–10.5)
CHLORIDE SERPL-SCNC: 106 MMOL/L — SIGNIFICANT CHANGE UP (ref 98–107)
CO2 SERPL-SCNC: 23 MMOL/L — SIGNIFICANT CHANGE UP (ref 22–31)
CREAT SERPL-MCNC: 1.04 MG/DL — SIGNIFICANT CHANGE UP (ref 0.5–1.3)
EGFR: 76 ML/MIN/1.73M2 — SIGNIFICANT CHANGE UP
ESTIMATED AVERAGE GLUCOSE: 220 — SIGNIFICANT CHANGE UP
GLUCOSE SERPL-MCNC: 198 MG/DL — HIGH (ref 70–99)
POTASSIUM SERPL-MCNC: 4.1 MMOL/L — SIGNIFICANT CHANGE UP (ref 3.5–5.3)
POTASSIUM SERPL-SCNC: 4.1 MMOL/L — SIGNIFICANT CHANGE UP (ref 3.5–5.3)
SODIUM SERPL-SCNC: 140 MMOL/L — SIGNIFICANT CHANGE UP (ref 135–145)

## 2024-07-29 NOTE — H&P PST ADULT - GENERAL COMMENTS
Was admitted at [facility] in feb 2024 with septemia secondary to osteomyelitis , he was treated IV antibiotics-- discharged after 3 weeks Was admitted at [facility] in feb 2024 with septemia secondary to osteomyelitis , he was treated with IV antibiotics-- was discharged after 3 weeks

## 2024-07-29 NOTE — H&P PST ADULT - PROBLEM SELECTOR PLAN 1
Patient tentatively scheduled for    Pre-op instructions provided.  Famotidine provided with instructions. Hibiclens provided with instructions and was signed by patient. Teach-back method was utilized to assess patient's understanding. Patient verbalized understanding.    ordered A1c, BMP Patient tentatively scheduled for  left hand carpal tunnel release     Pre-op instructions provided.  Famotidine provided with instructions. Hibiclens provided with instructions and was signed by patient. Teach-back method was utilized to assess patient's understanding. Patient verbalized understanding.    ordered A1c, BMP

## 2024-07-29 NOTE — H&P PST ADULT - LAST STRESS TEST
2024 ---after the discharge from hospital-- Dr Mcnamara 2024 ---after the discharge from hospital-- Dr Mcnamara--as per patient WNL

## 2024-07-29 NOTE — H&P PST ADULT - HISTORY OF PRESENT ILLNESS
73 y y.o. male hx of DM, CAD, HTN, HLD c/o at his right pinky is locking and triggering again. Patient is scheduled  for left hand carpal tunnel release   ? 73  y.o. male hx of DM, CAD x 1 stent, HTN, HLD . c/o left hand pain and numbness due to carpal tunnel syndrome  Patient is scheduled for left hand carpal tunnel release   ?

## 2024-07-29 NOTE — H&P PST ADULT - NSICDXPASTSURGICALHX_GEN_ALL_CORE_FT
PAST SURGICAL HISTORY:  arthroscopy left knee 1980, 1981    CAD S/P percutaneous coronary angioplasty     cholecystectomy 2011    laminectomy L3/L4/L5 1979, 1980, 2010    LASIK 2000    S/P tendon repair     S/P total knee replacement, left     Trigger finger release right hand digist 2, 3

## 2024-07-29 NOTE — H&P PST ADULT - NS MD HP INPLANTS MED DEV
hardware in back/Artificial joint/Vascular stents/Clips hardware in back and left knee/Artificial joint/Vascular stents/Clips

## 2024-07-29 NOTE — H&P PST ADULT - NSICDXPASTMEDICALHX_GEN_ALL_CORE_FT
PAST MEDICAL HISTORY:  Adrenal gland cyst right ;    CAD (coronary artery disease)     Diabetes mellitus x 10 years     Hyperlipidemia pt states last rx 1-2 years ago    Hypertension     Left carpal tunnel syndrome

## 2024-07-29 NOTE — H&P PST ADULT - PROBLEM SELECTOR PLAN 2
Patient instructed to hold Novolog and the morning of procedure. Pt stated understanding.    Lantus take only 40 units of night before the procedure

## 2024-07-29 NOTE — H&P PST ADULT - LAST ECHOCARDIOGRAM
2024--after the discharge from hospital--Dr Mcnamara 2024--after the discharge from hospital--Dr Mcnamara--as per patient WNL

## 2024-08-07 PROBLEM — I25.10 ATHEROSCLEROTIC HEART DISEASE OF NATIVE CORONARY ARTERY WITHOUT ANGINA PECTORIS: Chronic | Status: ACTIVE | Noted: 2024-07-29

## 2024-08-07 PROBLEM — G56.02 CARPAL TUNNEL SYNDROME, LEFT UPPER LIMB: Chronic | Status: ACTIVE | Noted: 2024-07-29

## 2024-08-09 ENCOUNTER — OUTPATIENT (OUTPATIENT)
Dept: OUTPATIENT SERVICES | Facility: HOSPITAL | Age: 74
LOS: 1 days | Discharge: HOME | End: 2024-08-09

## 2024-08-09 ENCOUNTER — APPOINTMENT (OUTPATIENT)
Dept: PLASTIC SURGERY | Facility: HOSPITAL | Age: 74
End: 2024-08-09

## 2024-08-09 ENCOUNTER — TRANSCRIPTION ENCOUNTER (OUTPATIENT)
Age: 74
End: 2024-08-09

## 2024-08-09 VITALS
RESPIRATION RATE: 16 BRPM | HEART RATE: 76 BPM | TEMPERATURE: 98 F | HEIGHT: 68 IN | SYSTOLIC BLOOD PRESSURE: 168 MMHG | DIASTOLIC BLOOD PRESSURE: 74 MMHG | OXYGEN SATURATION: 97 % | WEIGHT: 195.99 LBS

## 2024-08-09 VITALS
HEART RATE: 67 BPM | DIASTOLIC BLOOD PRESSURE: 60 MMHG | SYSTOLIC BLOOD PRESSURE: 147 MMHG | OXYGEN SATURATION: 100 % | RESPIRATION RATE: 15 BRPM | TEMPERATURE: 98 F

## 2024-08-09 DIAGNOSIS — Z98.890 OTHER SPECIFIED POSTPROCEDURAL STATES: Chronic | ICD-10-CM

## 2024-08-09 DIAGNOSIS — Z96.652 PRESENCE OF LEFT ARTIFICIAL KNEE JOINT: Chronic | ICD-10-CM

## 2024-08-09 DIAGNOSIS — G56.02 CARPAL TUNNEL SYNDROME, LEFT UPPER LIMB: ICD-10-CM

## 2024-08-09 DIAGNOSIS — I25.10 ATHEROSCLEROTIC HEART DISEASE OF NATIVE CORONARY ARTERY WITHOUT ANGINA PECTORIS: Chronic | ICD-10-CM

## 2024-08-09 LAB — GLUCOSE BLDC GLUCOMTR-MCNC: 102 MG/DL — HIGH (ref 70–99)

## 2024-08-09 PROCEDURE — 20526 THER INJECTION CARP TUNNEL: CPT | Mod: LT,59

## 2024-08-09 PROCEDURE — 64721 CARPAL TUNNEL SURGERY: CPT | Mod: LT

## 2024-08-09 RX ORDER — AMLODIPINE BESYLATE 2.5 MG/1
1 TABLET ORAL
Refills: 0 | DISCHARGE

## 2024-08-09 RX ORDER — ATORVASTATIN CALCIUM 40 MG/1
1 TABLET, FILM COATED ORAL
Refills: 0 | DISCHARGE

## 2024-08-09 RX ORDER — TICAGRELOR 90 MG/1
1 TABLET ORAL
Refills: 0 | DISCHARGE

## 2024-08-09 RX ORDER — LISINOPRIL 10 MG/1
1 TABLET ORAL
Refills: 0 | DISCHARGE

## 2024-08-09 RX ORDER — INSULIN ASPART 100 [IU]/ML
10 INJECTION, SOLUTION INTRAVENOUS; SUBCUTANEOUS
Refills: 0 | DISCHARGE

## 2024-08-09 RX ORDER — INSULIN GLARGINE-YFGN 100 [IU]/ML
50 INJECTION, SOLUTION SUBCUTANEOUS
Refills: 0 | DISCHARGE

## 2024-08-09 RX ORDER — ASPIRIN 500 MG
1 TABLET ORAL
Refills: 0 | DISCHARGE

## 2024-08-09 NOTE — ASU DISCHARGE PLAN (ADULT/PEDIATRIC) - CARE PROVIDER_API CALL
Matthew Soares)  Plastic Surgery  [address], [address]  [place] [address]  Phone: [phone]  Fax: [phone]  Follow Up Time:

## 2024-08-09 NOTE — ASU PREOPERATIVE ASSESSMENT, ADULT (IPARK ONLY) - FALL HARM RISK - HARM RISK INTERVENTIONS

## 2024-08-09 NOTE — ASU PREOP CHECKLIST - SELECT TESTS ORDERED
Results in MD note SSKI Counseling:  I discussed with the patient the risks of SSKI including but not limited to thyroid abnormalities, metallic taste, GI upset, fever, headache, acne, arthralgias, paraesthesias, lymphadenopathy, easy bleeding, arrhythmias, and allergic reaction.

## 2024-08-09 NOTE — ASU DISCHARGE PLAN (ADULT/PEDIATRIC) - ASU DC SPECIAL INSTRUCTIONSFT
Keep dressing dry and intact  Cover bandage with shower bag if showering    Follow up on Aug 19 with Dr. Soares.  Call to make an appt

## 2024-08-09 NOTE — ASU DISCHARGE PLAN (ADULT/PEDIATRIC) - CALL YOUR DOCTOR IF YOU HAVE ANY OF THE FOLLOWING:
Bleeding that does not stop/Swelling that gets worse/Pain not relieved by Medications/Fever greater than (need to indicate Fahrenheit or Celsius)/Numbness, tingling, color or temperature change to extremity Bleeding that does not stop/Swelling that gets worse/Pain not relieved by Medications/Fever greater than (need to indicate Fahrenheit or Celsius)/Numbness, tingling, color or temperature change to extremity/Nausea and vomiting that does not stop/Inability to tolerate liquids or foods

## 2024-08-19 ENCOUNTER — APPOINTMENT (OUTPATIENT)
Dept: PLASTIC SURGERY | Facility: CLINIC | Age: 74
End: 2024-08-19
Payer: MEDICARE

## 2024-08-19 VITALS
DIASTOLIC BLOOD PRESSURE: 87 MMHG | HEIGHT: 70 IN | SYSTOLIC BLOOD PRESSURE: 156 MMHG | HEART RATE: 54 BPM | BODY MASS INDEX: 28.63 KG/M2 | OXYGEN SATURATION: 97 % | WEIGHT: 200 LBS | TEMPERATURE: 97.4 F

## 2024-08-19 DIAGNOSIS — Z98.890 OTHER SPECIFIED POSTPROCEDURAL STATES: ICD-10-CM

## 2024-08-19 PROCEDURE — 99024 POSTOP FOLLOW-UP VISIT: CPT

## 2024-08-21 PROBLEM — Z98.890 S/P CARPAL TUNNEL RELEASE: Status: ACTIVE | Noted: 2024-08-21

## 2024-08-21 NOTE — REVIEW OF SYSTEMS
[Fever] : no fever [Chills] : no chills [Negative] : Gastrointestinal [FreeTextEntry9] : Left hand dressing in place

## 2024-08-21 NOTE — ADDENDUM
[FreeTextEntry1] :  I SIVAKUMAR Vidales acted as a scribe for Dr. Matthew Soares for this patient visit.

## 2024-08-21 NOTE — PHYSICAL EXAM
[NI] : Normal [de-identified] : Left hand incision healing well no sign of infection no erythema sutures in place.  Will maintain sutures

## 2024-08-21 NOTE — PHYSICAL EXAM
[NI] : Normal [de-identified] : Left hand incision healing well no sign of infection no erythema sutures in place.  Will maintain sutures

## 2024-08-21 NOTE — REASON FOR VISIT
[Post Op: _________] : a [unfilled] post op visit [FreeTextEntry1] : s/p: Left hand carpal tunnel, dos: 8/19/24. Patient reports he is here for suture removal from his left hand, he states he is doing well, he denies having fever or chills.

## 2024-08-27 ENCOUNTER — APPOINTMENT (OUTPATIENT)
Dept: PLASTIC SURGERY | Facility: CLINIC | Age: 74
End: 2024-08-27

## 2024-08-28 ENCOUNTER — APPOINTMENT (OUTPATIENT)
Dept: PLASTIC SURGERY | Facility: CLINIC | Age: 74
End: 2024-08-28

## 2024-08-28 VITALS
BODY MASS INDEX: 28.63 KG/M2 | SYSTOLIC BLOOD PRESSURE: 195 MMHG | HEART RATE: 56 BPM | OXYGEN SATURATION: 96 % | TEMPERATURE: 98 F | WEIGHT: 200 LBS | DIASTOLIC BLOOD PRESSURE: 86 MMHG | HEIGHT: 70 IN

## 2024-08-28 PROCEDURE — 99024 POSTOP FOLLOW-UP VISIT: CPT

## 2025-01-22 NOTE — ASU PREOP CHECKLIST - AICD PRESENT
Patient presents with open wound to RUE; no concern for infection at this time to either site. Left chest wall with pacemaker surgical site, c/d/I with just medial to that son states a nurse removed a sticker with force causing a traumatic wound. This is c/d/I. Will leave alone for now. Can cover with dry gauze however avoid adhesive d/t skin sensitivities.   Dressing demo to son, ensure to apply enough SSD cream to keep moist. Verbalized understanding. RTC in 1 week or sooner if any sx or changes. Son in agreement with plan. Supplies ordered and MD tasked for RX for SSD cream.   
no

## (undated) DEVICE — SUT ETHILON 4-0 18" PS-2

## (undated) DEVICE — DRSG COBAN 2" LF STERILE

## (undated) DEVICE — WARMING BLANKET LOWER ADULT

## (undated) DEVICE — PACK HAND

## (undated) DEVICE — SOL IRR POUR NS 0.9% 500ML

## (undated) DEVICE — VENODYNE/SCD SLEEVE CALF MEDIUM

## (undated) DEVICE — GLV 8 PROTEXIS (WHITE)

## (undated) DEVICE — DRSG STERISTRIPS 0.5 X 4"

## (undated) DEVICE — TOURNIQUET CUFF 18" DUAL PORT SINGLE BLADDER LUER LOCK (BLACK)

## (undated) DEVICE — SUT MONOCRYL 5-0 18" P-3 UNDYED

## (undated) DEVICE — BIPOLAR FORCEP KIRWAN JEWELERS STR 4" X 0.4MM W 12FT CORD (GREEN)

## (undated) DEVICE — DRSG CURITY GAUZE SPONGE 4 X 4" 12-PLY

## (undated) DEVICE — DRSG DERMABOND 0.7ML